# Patient Record
Sex: MALE | URBAN - METROPOLITAN AREA
[De-identification: names, ages, dates, MRNs, and addresses within clinical notes are randomized per-mention and may not be internally consistent; named-entity substitution may affect disease eponyms.]

---

## 2021-01-19 ENCOUNTER — OFFICE VISIT (OUTPATIENT)
Dept: URGENT CARE | Facility: MEDICAL CENTER | Age: 21
End: 2021-01-19

## 2021-01-19 VITALS
WEIGHT: 160 LBS | DIASTOLIC BLOOD PRESSURE: 65 MMHG | SYSTOLIC BLOOD PRESSURE: 147 MMHG | BODY MASS INDEX: 29.44 KG/M2 | TEMPERATURE: 99 F | HEIGHT: 62 IN | RESPIRATION RATE: 20 BRPM | HEART RATE: 90 BPM | OXYGEN SATURATION: 100 %

## 2021-01-19 DIAGNOSIS — B36.9 FUNGAL INFECTION OF SKIN: Primary | ICD-10-CM

## 2021-01-19 RX ORDER — CLOTRIMAZOLE 1 %
CREAM (GRAM) TOPICAL 2 TIMES DAILY
Qty: 30 G | Refills: 0 | Status: SHIPPED | OUTPATIENT
Start: 2021-01-19

## 2021-01-20 NOTE — PROGRESS NOTES
[id number]        NAME: Sheryl Coker is a 23 y o  male  : 2000    MRN: [id number]  DATE: 2021  TIME: 8:13 PM    Assessment and Plan   Fungal infection of skin [B36 9]  1  Fungal infection of skin  clotrimazole (LOTRIMIN) 1 % cream         Patient Instructions     Begin using cream as instructed  Continue to keep the area clean and dry  Follow up with PCP in 3-5 days for continued symptoms  Proceeded to the ER with worsening of symptoms  Follow up with PCP in 3-5 days  Proceed to  ER if symptoms worsen  Chief Complaint     Chief Complaint   Patient presents with    Rash     Patient states he has a rash in bilateral groin for about a week  Patient states it itches more at night          History of Present Illness       23year old male no significant past medical history presents the office for bilateral groin rash that began for him a week ago  Patient notes that the rash is itchy  He denies any exposure to anything new  He denies any swelling of lips tongue or mouth her rash any other location on his body  He is eating and drinking normally  He is voiding normally  He denies any sick contacts with similar symptoms  Rash  This is a new problem  The current episode started in the past 7 days  The problem is unchanged  The affected locations include the groin  The rash is characterized by redness and itchiness  He was exposed to nothing  Pertinent negatives include no fever or shortness of breath  Treatments tried: Antihistamine orally  Review of Systems   Review of Systems   Constitutional: Negative for chills and fever  Respiratory: Negative for chest tightness and shortness of breath  Cardiovascular: Negative for chest pain and palpitations  Genitourinary: Negative  Musculoskeletal: Negative  Skin: Positive for rash  Neurological: Negative for dizziness, light-headedness and headaches           Current Medications       Current Outpatient Medications:     clotrimazole (LOTRIMIN) 1 % cream, Apply topically 2 (two) times a day, Disp: 30 g, Rfl: 0    Current Allergies     Allergies as of 01/19/2021 - Reviewed 01/19/2021   Allergen Reaction Noted    Penicillins Rash 01/19/2021            The following portions of the patient's history were reviewed and updated as appropriate: allergies, current medications, past family history, past medical history, past social history, past surgical history and problem list      History reviewed  No pertinent past medical history  Past Surgical History:   Procedure Laterality Date    ABDOMINAL SURGERY         History reviewed  No pertinent family history  Medications have been verified  Objective   /65   Pulse 90   Temp 99 °F (37 2 °C)   Resp 20   Ht 5' 2" (1 575 m)   Wt 72 6 kg (160 lb)   SpO2 100%   BMI 29 26 kg/m²   No LMP for male patient  Physical Exam     Physical Exam  Constitutional:       General: He is not in acute distress  Appearance: He is well-developed  He is not ill-appearing or diaphoretic  HENT:      Head: Normocephalic and atraumatic  Comments: No mucous membrane involvement  Right Ear: Hearing, tympanic membrane, ear canal and external ear normal       Left Ear: Hearing, tympanic membrane, ear canal and external ear normal       Nose: Nose normal  No mucosal edema or rhinorrhea  Mouth/Throat:      Pharynx: Uvula midline  No oropharyngeal exudate, posterior oropharyngeal erythema or uvula swelling  Eyes:      General: Lids are normal       Conjunctiva/sclera: Conjunctivae normal       Pupils: Pupils are equal, round, and reactive to light  Neck:      Musculoskeletal: Neck supple  Cardiovascular:      Rate and Rhythm: Normal rate and regular rhythm  Heart sounds: Normal heart sounds, S1 normal and S2 normal  No murmur  Pulmonary:      Effort: Pulmonary effort is normal  No respiratory distress        Breath sounds: Normal breath sounds  No stridor  No decreased breath sounds, wheezing or rales  Abdominal:      General: Bowel sounds are normal       Palpations: Abdomen is soft  Tenderness: There is no abdominal tenderness  Lymphadenopathy:      Cervical: No cervical adenopathy  Skin:     General: Skin is warm and dry  Capillary Refill: Capillary refill takes less than 2 seconds  Findings: Rash present  Neurological:      Mental Status: He is alert  Psychiatric:         Behavior: Behavior is cooperative

## 2021-01-20 NOTE — PATIENT INSTRUCTIONS
Begin using cream as instructed  Continue to keep the area clean and dry  Follow up with PCP in 3-5 days for continued symptoms  Proceeded to the ER with worsening of symptoms  Jock Itch   WHAT YOU NEED TO KNOW:   Jock itch is a rash on your groin  The groin is the area between your abdomen and legs  Jock itch is usually easy to treat and prevent  It is caused by a fungus  The fungus also causes athlete's foot  DISCHARGE INSTRUCTIONS:   Medicines:   · Fungus medicine:  Jock itch is usually treated with a cream that kills the fungus  Apply the cream to the rash and the skin around it as directed  You may need to apply the cream 1 to 2 times each day for 2 weeks  You may be given this medicine as a pill if the cream does not help  · Take your medicine as directed  Contact your healthcare provider if you think your medicine is not helping or if you have side effects  Tell him or her if you are allergic to any medicine  Keep a list of the medicines, vitamins, and herbs you take  Include the amounts, and when and why you take them  Bring the list or the pill bottles to follow-up visits  Carry your medicine list with you in case of an emergency  Manage and prevent jock itch:   · Keep the area dry  · Wear light, loose clothes  Do not share clothes  · Do not wear wet clothes for long periods  Wash athletic gear after you play sports  · Bathe daily  Dry your skin completely after you bathe  Apply cream or powder after you bathe as directed if you get jock itch often  Wash your hands often to prevent the spread of the fungus  You may want to wear disposable gloves when you clean your feet  The gloves will keep the fungus from moving from your feet to your hands  · Use separate towels to dry each part of your body  Put your socks on before you put on your underwear so you do not spread the fungus from your feet to your groin      · Lose weight if you weigh more than your healthcare provider suggests  Follow up with your healthcare provider or skin specialist as directed: Your healthcare provider will examine your rash to see how well it is healing  If you take medicine as a pill, he may draw blood to check how your liver and kidneys are working  Write down your questions so you remember to ask them during your visits  Contact your healthcare provider or skin specialist if:   · Your signs and symptoms do not get better within 2 weeks of treatment  · Your signs and symptoms get worse or come back after treatment  · You get a rash on a part of your body other than your groin  · You have a fever  · You have questions or concerns about your condition or care  © Copyright [address] Information is for End User's use only and may not be sold, redistributed or otherwise used for commercial purposes  All illustrations and images included in CareNotes® are the copyrighted property of [employer]  or Adithya Hernandez  The above information is an  only  It is not intended as medical advice for individual conditions or treatments  Talk to your doctor, nurse or pharmacist before following any medical regimen to see if it is safe and effective for you